# Patient Record
Sex: FEMALE | Race: ASIAN | NOT HISPANIC OR LATINO | URBAN - METROPOLITAN AREA
[De-identification: names, ages, dates, MRNs, and addresses within clinical notes are randomized per-mention and may not be internally consistent; named-entity substitution may affect disease eponyms.]

---

## 2024-05-11 ENCOUNTER — OFFICE VISIT (OUTPATIENT)
Dept: URGENT CARE | Facility: CLINIC | Age: 34
End: 2024-05-11
Payer: COMMERCIAL

## 2024-05-11 VITALS
WEIGHT: 159 LBS | SYSTOLIC BLOOD PRESSURE: 118 MMHG | HEART RATE: 120 BPM | OXYGEN SATURATION: 97 % | TEMPERATURE: 98.7 F | DIASTOLIC BLOOD PRESSURE: 81 MMHG | RESPIRATION RATE: 20 BRPM

## 2024-05-11 DIAGNOSIS — R50.9 SUBJECTIVE FEVER: ICD-10-CM

## 2024-05-11 DIAGNOSIS — R11.0 NAUSEA WITHOUT VOMITING: ICD-10-CM

## 2024-05-11 DIAGNOSIS — R51.9 ACUTE NONINTRACTABLE HEADACHE, UNSPECIFIED HEADACHE TYPE: Primary | ICD-10-CM

## 2024-05-11 LAB
POC RAPID STREP: NEGATIVE
POC TRIPLEX FLU A-AG: NORMAL
POC TRIPLEX FLU B-AG: NORMAL
POC TRIPLEX SARSCOV-2 AG: NORMAL

## 2024-05-11 PROCEDURE — 87428 SARSCOV & INF VIR A&B AG IA: CPT | Performed by: PHYSICIAN ASSISTANT

## 2024-05-11 PROCEDURE — 87880 STREP A ASSAY W/OPTIC: CPT | Performed by: PHYSICIAN ASSISTANT

## 2024-05-11 PROCEDURE — 96372 THER/PROPH/DIAG INJ SC/IM: CPT | Performed by: PHYSICIAN ASSISTANT

## 2024-05-11 PROCEDURE — 1036F TOBACCO NON-USER: CPT | Performed by: PHYSICIAN ASSISTANT

## 2024-05-11 PROCEDURE — 99203 OFFICE O/P NEW LOW 30 MIN: CPT | Performed by: PHYSICIAN ASSISTANT

## 2024-05-11 RX ORDER — KETOROLAC TROMETHAMINE 15 MG/ML
15 INJECTION, SOLUTION INTRAMUSCULAR; INTRAVENOUS ONCE
Status: COMPLETED | OUTPATIENT
Start: 2024-05-11 | End: 2024-05-11

## 2024-05-11 RX ORDER — ONDANSETRON 4 MG/1
4 TABLET, FILM COATED ORAL EVERY 8 HOURS PRN
Qty: 20 TABLET | Refills: 0 | Status: SHIPPED | OUTPATIENT
Start: 2024-05-11 | End: 2024-05-18

## 2024-05-11 RX ORDER — ONDANSETRON 4 MG/1
4 TABLET, FILM COATED ORAL ONCE
Status: COMPLETED | OUTPATIENT
Start: 2024-05-11 | End: 2024-05-11

## 2024-05-11 RX ADMIN — ONDANSETRON 4 MG: 4 TABLET, FILM COATED ORAL at 11:24

## 2024-05-11 RX ADMIN — KETOROLAC TROMETHAMINE 15 MG: 15 INJECTION, SOLUTION INTRAMUSCULAR; INTRAVENOUS at 11:10

## 2024-05-11 ASSESSMENT — ENCOUNTER SYMPTOMS
BLOOD IN STOOL: 0
ALLERGIC/IMMUNOLOGIC NEGATIVE: 1
FEVER: 0
ARTHRALGIAS: 0
RHINORRHEA: 0
COLOR CHANGE: 0
SORE THROAT: 0
DYSURIA: 0
PHOTOPHOBIA: 1
HEMATOLOGIC/LYMPHATIC NEGATIVE: 1
ENDOCRINE NEGATIVE: 1
BACK PAIN: 0
EYE DISCHARGE: 0
APPETITE CHANGE: 0
SHORTNESS OF BREATH: 0
NAUSEA: 1
WHEEZING: 0
COUGH: 0
DIARRHEA: 0
ABDOMINAL PAIN: 0
HEADACHES: 1
FATIGUE: 0
SINUS PRESSURE: 0
EYE PAIN: 0
PSYCHIATRIC NEGATIVE: 1
ACTIVITY CHANGE: 0
VOMITING: 0
FLANK PAIN: 0
WOUND: 0
EYE REDNESS: 0

## 2024-05-11 ASSESSMENT — PAIN SCALES - GENERAL: PAINLEVEL: 10-WORST PAIN EVER

## 2024-05-11 NOTE — PROGRESS NOTES
Subjective   Patient ID: Fito Valverde is a 33 y.o. female who presents for Headache and Vomiting (3days ).  Patient is here initially with reports of headache and vomiting for the last 3 days however on further clarification with  who acts as  it is stated that the patient has not actually vomited over the course the last 3 days but has had a headache and has felt nauseous.  Headache came on gradually and is intermittent.  Patient notes that he history of headaches.  She is endorsing subjective fevers but none have been recorded.  She also notes myalgias.  She denies any cough or upper respiratory symptoms.  She denies any diarrhea.  She does note light sensitivity.  She denies any visual changes.  Tylenol has been helpful for the headache at home but the headache has not fully resolved over the course of the last 3 days, hence patient presents to urgent care for evaluation      Review of Systems   Constitutional:  Negative for activity change, appetite change, fatigue and fever.   HENT:  Negative for congestion, rhinorrhea, sinus pressure and sore throat.    Eyes:  Positive for photophobia. Negative for pain, discharge, redness and visual disturbance.   Respiratory:  Negative for cough, shortness of breath and wheezing.    Cardiovascular:  Negative for chest pain and leg swelling.   Gastrointestinal:  Positive for nausea. Negative for abdominal pain, blood in stool, diarrhea and vomiting.   Endocrine: Negative.    Genitourinary:  Negative for dysuria and flank pain.   Musculoskeletal:  Negative for arthralgias, back pain and gait problem.   Skin:  Negative for color change, rash and wound.   Allergic/Immunologic: Negative.    Neurological:  Positive for headaches.   Hematological: Negative.    Psychiatric/Behavioral: Negative.         Objective   /81   Pulse (!) 120   Temp 37.1 °C (98.7 °F)   Resp 20   Wt 72.1 kg (159 lb)   SpO2 97%   Physical Exam  Constitutional:       General: She is  not in acute distress.     Appearance: Normal appearance. She is not ill-appearing, toxic-appearing or diaphoretic.   HENT:      Head: Normocephalic and atraumatic.      Right Ear: Tympanic membrane and ear canal normal.      Left Ear: Tympanic membrane and ear canal normal.      Nose: No congestion or rhinorrhea.      Mouth/Throat:      Mouth: Mucous membranes are moist.      Pharynx: Oropharynx is clear.   Eyes:      Conjunctiva/sclera: Conjunctivae normal.   Cardiovascular:      Rate and Rhythm: Normal rate and regular rhythm.      Heart sounds: No murmur heard.  Pulmonary:      Effort: Pulmonary effort is normal. No respiratory distress.      Breath sounds: Normal breath sounds. No wheezing.   Musculoskeletal:         General: No swelling, tenderness, deformity or signs of injury. Normal range of motion.      Cervical back: Normal range of motion and neck supple. No rigidity.   Skin:     General: Skin is warm and dry.      Findings: No erythema or rash.   Neurological:      General: No focal deficit present.      Mental Status: She is alert and oriented to person, place, and time.      Coordination: Coordination normal.      Gait: Gait normal.         Assessment/Plan   Problem List Items Addressed This Visit       Acute nonintractable headache - Primary    Relevant Medications    ketorolac (Toradol) injection 15 mg (Completed)    Other Relevant Orders    POCT rapid strep A manually resulted (Completed)    POCT BD [employer] Tri[employer] Ag (Completed)    Nausea without vomiting    Relevant Medications    ondansetron (Zofran) tablet 4 mg (Completed)    Other Relevant Orders    POCT rapid strep A manually resulted (Completed)    POCT BD [employer] Ag (Completed)    Subjective fever    Relevant Orders    POCT BD [employer] Ag (Completed)      Headache improved with Toradol and Zofran here in office  Recommending continued use of Tylenol and ibuprofen as needed for the headache returns  Testing for strep flu,  COVID all negative  Given the elevated heart rate and subjective fevers I do suspect likely viral syndrome causing the patient's complaint.  Otherwise neurologically intact at time of exam alert, appropriately responsive  Recommending continued use of Tylenol and ibuprofen at home patient may take 1 g of Tylenol 3 times per day, 400 mg ibuprofen 3 times per day as needed    If with worsening headache despite treatment recommending evaluation by the emergency room

## 2024-05-11 NOTE — PATIENT INSTRUCTIONS
Assessment/Plan   Problem List Items Addressed This Visit       Acute nonintractable headache - Primary    Relevant Medications    ketorolac (Toradol) injection 15 mg (Completed)    Other Relevant Orders    POCT rapid strep A manually resulted (Completed)    POCT BD [employer] Ag (Completed)    Nausea without vomiting    Relevant Medications    ondansetron (Zofran) tablet 4 mg (Completed)    Other Relevant Orders    POCT rapid strep A manually resulted (Completed)    POCT BD [employer] Ag (Completed)    Subjective fever    Relevant Orders    POCT BD [employer] Ag (Completed)      Headache improved with Toradol and Zofran here in office  Recommending continued use of Tylenol and ibuprofen as needed for the headache returns  Testing for strep flu, COVID all negative  Given the elevated heart rate and subjective fevers I do suspect likely viral syndrome causing the patient's complaint.  Otherwise neurologically intact at time of exam alert, appropriately responsive  Recommending continued use of Tylenol and ibuprofen at home patient may take 1 g of Tylenol 3 times per day, 400 mg ibuprofen 3 times per day as needed    If with worsening headache despite treatment recommending evaluation by the emergency room